# Patient Record
Sex: MALE | Race: WHITE | URBAN - METROPOLITAN AREA
[De-identification: names, ages, dates, MRNs, and addresses within clinical notes are randomized per-mention and may not be internally consistent; named-entity substitution may affect disease eponyms.]

---

## 2023-06-21 ENCOUNTER — TELEPHONE (OUTPATIENT)
Dept: ORTHOPEDIC SURGERY | Age: 53
End: 2023-06-21

## 2023-06-21 NOTE — TELEPHONE ENCOUNTER
Called pt and explained that due to MET's thumb SX, he is unable to perform the SX the pt requested. Told pt that if the other dr would not do his sx, please call and we'll set him up with one of MET's surgical partners. Also, told pt I would be cancelling his upcoming apt with MET.